# Patient Record
Sex: MALE | Race: WHITE | Employment: FULL TIME | URBAN - METROPOLITAN AREA
[De-identification: names, ages, dates, MRNs, and addresses within clinical notes are randomized per-mention and may not be internally consistent; named-entity substitution may affect disease eponyms.]

---

## 2017-11-15 ENCOUNTER — OFFICE VISIT (OUTPATIENT)
Dept: ORTHOPEDIC SURGERY | Age: 47
End: 2017-11-15

## 2017-11-15 VITALS — HEIGHT: 68 IN | WEIGHT: 185 LBS | BODY MASS INDEX: 28.04 KG/M2

## 2017-11-15 DIAGNOSIS — R52 PAIN: Primary | ICD-10-CM

## 2017-11-15 DIAGNOSIS — G56.21 CUBITAL TUNNEL SYNDROME ON RIGHT: ICD-10-CM

## 2017-11-15 DIAGNOSIS — M77.11 LATERAL EPICONDYLITIS OF RIGHT ELBOW: ICD-10-CM

## 2017-11-15 PROCEDURE — G8427 DOCREV CUR MEDS BY ELIG CLIN: HCPCS | Performed by: ORTHOPAEDIC SURGERY

## 2017-11-15 PROCEDURE — 99203 OFFICE O/P NEW LOW 30 MIN: CPT | Performed by: ORTHOPAEDIC SURGERY

## 2017-11-15 PROCEDURE — G8419 CALC BMI OUT NRM PARAM NOF/U: HCPCS | Performed by: ORTHOPAEDIC SURGERY

## 2017-11-15 PROCEDURE — 4004F PT TOBACCO SCREEN RCVD TLK: CPT | Performed by: ORTHOPAEDIC SURGERY

## 2017-11-15 PROCEDURE — G8484 FLU IMMUNIZE NO ADMIN: HCPCS | Performed by: ORTHOPAEDIC SURGERY

## 2017-11-15 RX ORDER — ROSUVASTATIN CALCIUM 5 MG/1
5 TABLET, COATED ORAL DAILY
COMMUNITY
End: 2020-02-18

## 2017-11-15 RX ORDER — PREDNISONE 10 MG/1
TABLET ORAL
Qty: 26 TABLET | Refills: 0 | Status: SHIPPED | OUTPATIENT
Start: 2017-11-15 | End: 2020-02-18

## 2017-11-15 NOTE — PROGRESS NOTES
allergies, past medical, surgical, social and family histories were reviewed and updated as appropriate. Review of Systems  Complete Review of Systems performed and is non-contributory except for what is noted in HPI. Vital Signs  Vitals:    11/15/17 1000   Weight: 185 lb (83.9 kg)   Height: 5' 8\" (1.727 m)     Body mass index is 28.13 kg/m². Physical Exam  Constitutional:  Patient is well-nourished and demonstrates normal hygiene. Mental Status:  Patient is alert and oriented to person, place and time. Skin:  No rashes or erythema but there is obvious ecchymosis on the posterior lateral aspect of the right elbow    Right Hand Examination:  Inspection:  Ecchymosis  Finger Range of Motion:  Full  Wrist Range of Motion:  Normal  Elbow range of motion: Full but pain at the extremes of extension and flexion  Vascular Exam:  Radial and ulnar arterial pulses are normal.  Ralph's Test reveals patent palmar arch. Neurologic Exam: Tinel sign is positive over the ulnar nerve at the cubital tunnel elbow flexion test is just mildly positive. He is also slightly tender over the radial tunnel  Intrinsic Muscle Strength:  He may have decreased bulk of the 1st dorsal interosseous  Extrinsic Muscle Strength: Normal  Special Tests:  He is stable to varus and valgus stress testing but resisted wrist extension is exquisitely positive. He slightly tender over the tip of the olecranon as well    Left Hand Examination:  Inspection:  No atrophy  Finger Range of Motion:  Full  Wrist Range of Motion:  Normal  Vascular Exam:  Normal capillary refill   Neurologic Exam: Comparative bulk of the 1st dorsal interosseous is definitely slightly different  Intrinsic Muscle Strength:  Normal  Extrinsic Muscle Strength: Normal  Special Tests:          Additional Comments:     Additional Examinations:  X-Ray Findings:  PA lateral and radial head view x-rays of the right elbow show a fairly large bone spur present off of the olecranon. The area where the spur had fractured is probably healed there is no elevation of the anterior fat pad sign and there is congruent alignment of the elbow joint itself.   Additional Diagnostic Test Findings:    Office Procedures:    Orders Placed This Encounter   Procedures    XR ELBOW RIGHT (MIN 3 VIEWS)     [id number]     Standing Status:   Future     Number of Occurrences:   1     Standing Expiration Date:   11/15/2018     Order Specific Question:   Reason for exam:     Answer:   Elbow Pain

## 2017-11-16 ENCOUNTER — TELEPHONE (OUTPATIENT)
Dept: ORTHOPEDIC SURGERY | Age: 47
End: 2017-11-16

## 2017-11-29 ENCOUNTER — OFFICE VISIT (OUTPATIENT)
Dept: ORTHOPEDIC SURGERY | Age: 47
End: 2017-11-29

## 2017-11-29 VITALS — BODY MASS INDEX: 27.28 KG/M2 | WEIGHT: 180 LBS | HEIGHT: 68 IN

## 2017-11-29 DIAGNOSIS — G56.21 ULNAR NEUROPATHY OF RIGHT UPPER EXTREMITY: Primary | ICD-10-CM

## 2017-11-29 DIAGNOSIS — M77.11 LATERAL EPICONDYLITIS OF RIGHT ELBOW: ICD-10-CM

## 2017-11-29 PROCEDURE — G8427 DOCREV CUR MEDS BY ELIG CLIN: HCPCS | Performed by: ORTHOPAEDIC SURGERY

## 2017-11-29 PROCEDURE — G8419 CALC BMI OUT NRM PARAM NOF/U: HCPCS | Performed by: ORTHOPAEDIC SURGERY

## 2017-11-29 PROCEDURE — 4004F PT TOBACCO SCREEN RCVD TLK: CPT | Performed by: ORTHOPAEDIC SURGERY

## 2017-11-29 PROCEDURE — 99213 OFFICE O/P EST LOW 20 MIN: CPT | Performed by: ORTHOPAEDIC SURGERY

## 2017-11-29 PROCEDURE — G8484 FLU IMMUNIZE NO ADMIN: HCPCS | Performed by: ORTHOPAEDIC SURGERY

## 2017-12-05 ENCOUNTER — OFFICE VISIT (OUTPATIENT)
Dept: ORTHOPEDIC SURGERY | Age: 47
End: 2017-12-05

## 2017-12-05 DIAGNOSIS — G56.21 ULNAR NEUROPATHY OF RIGHT UPPER EXTREMITY: ICD-10-CM

## 2017-12-05 DIAGNOSIS — G56.01 CARPAL TUNNEL SYNDROME OF RIGHT WRIST: Primary | ICD-10-CM

## 2017-12-05 PROCEDURE — 95908 NRV CNDJ TST 3-4 STUDIES: CPT | Performed by: PHYSICAL MEDICINE & REHABILITATION

## 2017-12-05 PROCEDURE — 95886 MUSC TEST DONE W/N TEST COMP: CPT | Performed by: PHYSICAL MEDICINE & REHABILITATION

## 2017-12-05 NOTE — PROGRESS NOTES
[address] MEDICINE      Patient: Veena Reardon Age: 41 Years 8 Months  Sex: Male Date: 12/5/2017  YOB: 1970 Ref. Phys.: Dr Heber Paul  Notes:  r/o right ulnar neuropathy      Sensory NCS      Nerve / Sites Peak PeakAmp Dist Jose Roberto    ms µV cm m/s   R MEDIAN - D2 ULNAR D5   1. Median Wrist 3.85 28.9 14 45.9   2. Ulnar Wrist 3.20 29.7 14 56.0       Motor NCS      Nerve / Sites Lat Amp Amp Dist Jose Roberto    ms mV % cm m/s   R MEDIAN - APB   1. Wrist 3.60 3.9 100 8    2. Elbow 7.80 4.0 101 25 59.5   R ULNAR - ADM   1. Wrist 3.20 5.5 100 8    2. B. Elbow 6.15 6.6 121 21 71.2   3. A. Elbow 8.15 5.7 104 10 50.0       EMG Summary Table     Spontaneous MUAP Recruit. Ins. Act Fibs. PSW Fasics. H.F. Amp. Dur. Poly's. Pattern   R. FIRST D INTEROSS N None None None None N N N N   R. BICEPS N None None None None N N N N   R. TRICEPS N None None None None N N N N   R. EXT CARPI R BREV N None None None None N N N N   R. EXT DIG COMM N None None None None N N N N   R. PRON TERES N None None None None N N N N   R. ABD POLL BREVIS N None None None None N N N N       Summary: Right median SNAP latency is mildly slowed. Right ulnar conduction velocity across the elbow is slowed >20m/s compared to the distal forearm. The remainder of the nerve conduction studies and monopolar exam are normal, as recorded above. Impression: Abnormal examination. There is electrodiagnostic evidence of:    1. Mild right median mononeuropathy around the wrist (carpal tunnel syndrome)    2.  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

## 2017-12-06 ENCOUNTER — OFFICE VISIT (OUTPATIENT)
Dept: ORTHOPEDIC SURGERY | Age: 47
End: 2017-12-06

## 2017-12-06 DIAGNOSIS — G56.21 ULNAR NEUROPATHY OF RIGHT UPPER EXTREMITY: Primary | ICD-10-CM

## 2017-12-06 DIAGNOSIS — G56.01 CARPAL TUNNEL SYNDROME ON RIGHT: ICD-10-CM

## 2017-12-06 PROCEDURE — 4004F PT TOBACCO SCREEN RCVD TLK: CPT | Performed by: ORTHOPAEDIC SURGERY

## 2017-12-06 PROCEDURE — 99213 OFFICE O/P EST LOW 20 MIN: CPT | Performed by: ORTHOPAEDIC SURGERY

## 2017-12-06 PROCEDURE — G8427 DOCREV CUR MEDS BY ELIG CLIN: HCPCS | Performed by: ORTHOPAEDIC SURGERY

## 2017-12-06 PROCEDURE — G8419 CALC BMI OUT NRM PARAM NOF/U: HCPCS | Performed by: ORTHOPAEDIC SURGERY

## 2017-12-06 PROCEDURE — G8484 FLU IMMUNIZE NO ADMIN: HCPCS | Performed by: ORTHOPAEDIC SURGERY

## 2017-12-06 NOTE — PROGRESS NOTES
intrinsic atrophy. Neurologic exam does show strongly positive Tinel's sign over the ulnar nerve at the cubital tunnel and positive elbow flexion test.  Positive Tinel's and Phalen's at the wrist level. Examining the elbow itself shows he stable to varus and valgus stress testing I don't detect any subluxation of the ulnar nerve. He is slightly tender at the triceps insertion    Additional Comments:     Additional Examinations:  X-Ray Findings:    Additional Diagnostic Test Findings: I reviewed his EMG nerve conduction studies which document definite slowing of the ulnar nerve at the cubital tunnel as well as slowing of the median nerve at the carpal tunnel. No denervation potentials. I also reviewed reviewed the MRI findings which did not show any evidence of definite rupture review the extensor aponeurosis triceps or biceps. Office Procedures: Informed consent scheduling for surgery was performed today. No orders of the defined types were placed in this encounter.

## 2017-12-12 ENCOUNTER — TELEPHONE (OUTPATIENT)
Dept: ORTHOPEDIC SURGERY | Age: 47
End: 2017-12-12

## 2017-12-15 ENCOUNTER — TELEPHONE (OUTPATIENT)
Dept: ORTHOPEDIC SURGERY | Age: 47
End: 2017-12-15

## 2017-12-20 ENCOUNTER — OFFICE VISIT (OUTPATIENT)
Dept: ORTHOPEDIC SURGERY | Age: 47
End: 2017-12-20

## 2017-12-20 DIAGNOSIS — G56.21 CUBITAL TUNNEL SYNDROME ON RIGHT: Primary | ICD-10-CM

## 2017-12-20 PROCEDURE — 99024 POSTOP FOLLOW-UP VISIT: CPT | Performed by: ORTHOPAEDIC SURGERY

## 2017-12-20 NOTE — PROGRESS NOTES
Excellent early result from in situ release of the right ulnar nerve and carpal tunnel release complete resolution of his numbness and tingling. We will leave the sutures in place one further week as he is just 6 days postop today.   I will then follow up in a month

## 2018-02-19 ENCOUNTER — TELEPHONE (OUTPATIENT)
Dept: ORTHOPEDIC SURGERY | Age: 48
End: 2018-02-19

## 2020-02-18 ENCOUNTER — OFFICE VISIT (OUTPATIENT)
Dept: DERMATOLOGY | Age: 50
End: 2020-02-18
Payer: COMMERCIAL

## 2020-02-18 PROCEDURE — 17110 DESTRUCTION B9 LES UP TO 14: CPT | Performed by: DERMATOLOGY

## 2020-02-18 PROCEDURE — 11102 TANGNTL BX SKIN SINGLE LES: CPT | Performed by: DERMATOLOGY

## 2020-02-18 PROCEDURE — 11103 TANGNTL BX SKIN EA SEP/ADDL: CPT | Performed by: DERMATOLOGY

## 2020-02-18 PROCEDURE — 99203 OFFICE O/P NEW LOW 30 MIN: CPT | Performed by: DERMATOLOGY

## 2020-02-18 RX ORDER — AMLODIPINE BESYLATE 10 MG/1
10 TABLET ORAL
COMMUNITY

## 2020-02-18 RX ORDER — METRONIDAZOLE 7.5 MG/G
GEL TOPICAL
Qty: 60 G | Refills: 11 | Status: SHIPPED | OUTPATIENT
Start: 2020-02-18

## 2020-02-18 NOTE — PROGRESS NOTES
[facility]) Dermatology  Ryan Malave M.D.  [phone]       [facility] [place]  1970    52 y.o. male     Date of Visit: 2020    Chief Complaint:   Chief Complaint   Patient presents with    Skin Lesion        I was asked to see this patient by Dr. Tucker ref. provider found. History of Present Illness:  1. Patient presents today for total body skin exam-has not been seen since     Increasing number of seborrheic keratoses over his torso, left upper forehead. Increasing in size and number. Some are rubbing on his clothing and becoming irritated    [place]-patient has not noticed any changing in size, shape, color. Does have a history of dysplastic nevi. Brother recently diagnosed with malignant melanoma, grandfather  of malignant melanoma. Rosacea-previously given MetroGel 0.75% to use twice daily. Use this for about 6 months with good improvement of inflammatory papules. Does have persistent background erythema. Would like a refill of MetroGel      Skin history:  Patient has a prior history of dysplastic nevi. No history of skin cancer. Grandfather  of malignant melanoma. Father with a history of nonmelanoma skin cancer. Brother with a history of malignant melanoma Does wear hats and sunscreen. Prior patient in [address]    Seborrheic dermatitis-Loprox shampoo, more recently head and shoulders    Rosacea-MetroGel 0.75%     right mid paraspinal back compound nevus with adjacent seborrheic keratosis   left anterior shoulder compound nevus       Review of Systems:  Constitutional: Reports general sense of well-being   Skin-no new or changing pigmented lesions, no new rashes    Past Medical History, Surgical History, Family History, Medications and Allergies reviewed.     Social History:   Social History     Socioeconomic History    Marital status:      Spouse name: Not on file    Number of children: Not on file    Years of education: Not on file    back-medial 1.0 cm irregularly shaped tan and pink plaque rule out atypia  Left lower back-lateral 1.2 cm dark brown well-demarcated irregularly pigmented plaque rule out atypia  Left inferior chest 6 mm brown irregularly pigmented papule rule out atypia                  Assessment and Plan     1. Neoplasm of uncertain behavior of skin-left lower back medial, left lower back lateral, left inferior chest--Discussed possible diagnosis. Patient agreeable to biopsy. Verbal consent obtained after risks (infection, bleeding, scar), benefits and alternatives explained. -Area(s) to be biopsied were marked with a surgical pen. Site(s) were cleansed with alcohol. Local anesthesia achieved with 1% lidocaine with epinephrine/sodium bicarbonate. Shave biopsy performed with a razor blade. Hemostasis was achieved with aluminum chloride. The wound(s) were dressed with petrolatum and covered with a bandage. Specimen(s) sent to pathology. Pt educated re: risk of bleeding, infection, scar and wound care instructions. 2. Seborrheic keratoses, inflamed -left upper forehead-1 lesion(s) treated w/ liquid nitrogen. Edu re: risk of blister formation, discomfort, scar, hypopigmentation. Discussed wound care. 3. Seborrheic keratoses-monitor for change   4. Benign nevus - Benign acquired melanocytic nevi  -Recommend monthly self skin exams   -Educated regarding the ABCDEs of melanoma detection   -Call for any new/changing moles or concerning lesions  -Reviewed sun protective behavior -- sun avoidance during the peak hours of the day, sun-protective clothing (including hat and sunglasses), sunscreen use (water resistant, broad spectrum, SPF at least 30, need for reapplication every 2 to 3 hours), avoidance of tanning beds      5. Rosacea-start MetroGel 0.75% twice daily.   Reviewed proper use and side effects

## 2020-02-20 LAB — DERMATOLOGY PATHOLOGY REPORT: NORMAL

## 2022-11-02 ENCOUNTER — OFFICE VISIT (OUTPATIENT)
Dept: DERMATOLOGY | Age: 52
End: 2022-11-02
Payer: COMMERCIAL

## 2022-11-02 DIAGNOSIS — L82.1 SEBORRHEIC KERATOSES: ICD-10-CM

## 2022-11-02 DIAGNOSIS — D22.9 BENIGN NEVUS: ICD-10-CM

## 2022-11-02 DIAGNOSIS — L82.0 SEBORRHEIC KERATOSES, INFLAMED: Primary | ICD-10-CM

## 2022-11-02 PROCEDURE — 17110 DESTRUCTION B9 LES UP TO 14: CPT | Performed by: DERMATOLOGY

## 2022-11-02 PROCEDURE — 99213 OFFICE O/P EST LOW 20 MIN: CPT | Performed by: DERMATOLOGY

## 2022-11-02 NOTE — PROGRESS NOTES
[facility]) Dermatology  Tavon Blair M.D.  [phone]       Andrey Costa  1970    50 y.o. male     Date of Visit: 2022    Chief Complaint:   Chief Complaint   Patient presents with    Skin Exam     Rt flank, back, possible burn off warts, rt flank flat area        I was asked to see this patient by Dr. Tucker ref. provider found. History of Present Illness:  1. Total-body skin exam    Seborrheic keratoses. Increasing number of hyperkeratotic stuck on papules and plaques over the torso. No discrete lesion itching, bleeding, becoming symptomatic on torso. Multiple seborrheic keratoses cheeks, right upper eyelid increasing in size, becoming easily traumatized. Multiple nevi. Patient has not noticed any new or changing pigmented lesions. Stable in size, shape, color. Not itching, bleeding. Does not monitor his nevi for change especially back    Skin history:  Patient has a prior history of dysplastic nevi. No history of skin cancer. Grandfather  of malignant melanoma. Father with a history of nonmelanoma skin cancer. Brother with a history of malignant melanoma Does wear hats and sunscreen. Prior patient in [place]     Seborrheic dermatitis-Loprox shampoo, more recently head and shoulders     Rosacea-MetroGel 0.75%      right mid paraspinal back compound nevus with adjacent seborrheic keratosis   left anterior shoulder compound nevus       Review of Systems:  Constitutional: Reports general sense of well-being       Past Medical History, Surgical History, Family History, Medications and Allergies reviewed.     Social History:   Social History     Socioeconomic History    Marital status:      Spouse name: Not on file    Number of children: Not on file    Years of education: Not on file    Highest education level: Not on file   Occupational History    Not on file   Tobacco Use    Smoking status: Never    Smokeless tobacco: Current     Types: Snuff   Vaping Use    Vaping Use: Never used   Substance and Sexual Activity    Alcohol use: Yes     Alcohol/week: 21.0 standard drinks     Types: 21 Cans of beer per week     Comment: DAILY    Drug use: Not on file    Sexual activity: Not on file   Other Topics Concern    Not on file   Social History Narrative    Not on file     Social Determinants of Health     Financial Resource Strain: Not on file   Food Insecurity: Not on file   Transportation Needs: Not on file   Physical Activity: Not on file   Stress: Not on file   Social Connections: Not on file   Intimate Partner Violence: Not on file   Housing Stability: Not on file       Physical Examination       -General: Well-appearing, NAD  1. Normal affect. Total body skin exam including scalp, face, neck, chest, abdomen, back, bilateral upper extremities, bilateral lower extremities, ocular conjunctiva, external lips, and nails was performed. Examination normal unless stated below. Underwear area not examined. Scattered on the trunk and extremities are multiple well-defined round and oval symmetric smoothly-bordered uniformly brown macules and papules. Multiple hyperkeratotic stuck on papules and plaques torso, cheeks, left upper eyelid      Assessment and Plan     1. Seborrheic keratoses, inflamed -6-malar cheeks, left upper eyelid, left temple after the risks, complications, and alternatives were explained to the patient, including risk of blister formation, discomfort, scar, hypopigmentation, patient elected to proceed with cryotherapy. Lesion(s) were treated w/ liquid nitrogen using a Cryogun. 1 freeze thaw cycle was performed with a freeze time of 1-5 seconds. There were no immediate complications. Wound care instructions were discussed with the patient. 2. Seborrheic keratoses - Discussed underlying nature of seborrheic keratosis and low risk of malignancy. Treatment reserved for lesions that are itching, bleeding, growing or otherwise becoming bothersome. Discussed monitoring for change with reevaluation for changing lesions.      3. Benign nevus - Benign acquired melanocytic nevi  -Recommend monthly self skin exams   -Educated regarding the ABCDEs of melanoma detection   -Call for any new/changing moles or concerning lesions  -Reviewed sun protective behavior -- sun avoidance during the peak hours of the day, sun-protective clothing (including hat and sunglasses), sunscreen use (water resistant, broad spectrum, SPF at least 30, need for reapplication every 2 to 3 hours), avoidance of tanning beds   Discussed monitoring his skin for change especially back-highest incidence of melanoma in men